# Patient Record
Sex: MALE
[De-identification: names, ages, dates, MRNs, and addresses within clinical notes are randomized per-mention and may not be internally consistent; named-entity substitution may affect disease eponyms.]

---

## 2024-07-22 PROBLEM — Z00.00 ENCOUNTER FOR PREVENTIVE HEALTH EXAMINATION: Status: ACTIVE | Noted: 2024-07-22

## 2024-07-25 ENCOUNTER — APPOINTMENT (OUTPATIENT)
Dept: ORTHOPEDIC SURGERY | Facility: CLINIC | Age: 54
End: 2024-07-25
Payer: OTHER MISCELLANEOUS

## 2024-07-25 VITALS — HEIGHT: 67 IN | BODY MASS INDEX: 29.82 KG/M2 | WEIGHT: 190 LBS

## 2024-07-25 DIAGNOSIS — M95.8 OTHER SPECIFIED ACQUIRED DEFORMITIES OF MUSCULOSKELETAL SYSTEM: ICD-10-CM

## 2024-07-25 PROCEDURE — 99204 OFFICE O/P NEW MOD 45 MIN: CPT

## 2024-07-25 PROCEDURE — 73564 X-RAY EXAM KNEE 4 OR MORE: CPT | Mod: 50

## 2024-07-25 RX ORDER — MELOXICAM 15 MG/1
15 TABLET ORAL DAILY
Qty: 30 | Refills: 1 | Status: ACTIVE | COMMUNITY
Start: 2024-07-25 | End: 1900-01-01

## 2024-07-26 NOTE — PHYSICAL EXAM
[de-identified] : Left knee full ROM medial joint tenderness some minimal patellar tenderness no lateral joint line tenderness.  Negative Cherri and Lachman  Quads 4+/5  [de-identified] : 4 views of both knees show left knee with minimal narrowing KL 2 no obvious fx subluxation

## 2024-07-26 NOTE — HISTORY OF PRESENT ILLNESS
[de-identified] : AMIRAH VIEIRA is a 54 year  old  M patient that presents today with left knee pain. pt states in 2021 he fell and broke his left patella that was treated followed by about a year of physical therapy. 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 Pain is in medial joint.

## 2024-07-26 NOTE — DISCUSSION/SUMMARY
[de-identified] : Dakota is an active  with a work related knee injury previously treated now with recurrent medial knee pain with work and no recent or recurrent injury.  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 X-rays show only KL 2 OA change .  We have discussed arthroscopic evaluation and possible Agili C osteochondral plug as a good surgical option however he would like to try further non operative treatment first.  PLan:  I believe he would benefit significantly from platelet rich plasma intra-articular injection in the office along with further Physical therapy.  we will attempt to get approval from  for the PRP injection as we start the Physical Therapy next week. Inject once approved as PRP has been effective in giving significant relief to chondral injury and arthritis in multiple prospective randomized controlled trials and confirmed in over 40 systematic reviews.  Review of MRI x-rays and discussion of treatment options with patient and his wife along with dictation took 45 minutes.

## 2024-07-26 NOTE — DISCUSSION/SUMMARY
[de-identified] : Dakota is an active  with a work related knee injury previously treated now with recurrent medial knee pain with work and no recent or recurrent injury.  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 X-rays show only KL 2 OA change .  We have discussed arthroscopic evaluation and possible Agili C osteochondral plug as a good surgical option however he would like to try further non operative treatment first.  PLan:  I believe he would benefit significantly from platelet rich plasma intra-articular injection in the office along with further Physical therapy.  we will attempt to get approval from  for the PRP injection as we start the Physical Therapy next week. Inject once approved as PRP has been effective in giving significant relief to chondral injury and arthritis in multiple prospective randomized controlled trials and confirmed in over 40 systematic reviews.  Review of MRI x-rays and discussion of treatment options with patient and his wife along with dictation took 45 minutes.

## 2024-07-26 NOTE — PHYSICAL EXAM
[de-identified] : Left knee full ROM medial joint tenderness some minimal patellar tenderness no lateral joint line tenderness.  Negative Cherri and Lachman  Quads 4+/5  [de-identified] : 4 views of both knees show left knee with minimal narrowing KL 2 no obvious fx subluxation

## 2024-07-26 NOTE — HISTORY OF PRESENT ILLNESS
[de-identified] : AMIRAH VIEIRA is a 54 year  old  M patient that presents today with left knee pain. pt states in 2021 he fell and broke his left patella that was treated followed by about a year of physical therapy. 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 Pain is in medial joint.

## 2024-08-09 ENCOUNTER — NON-APPOINTMENT (OUTPATIENT)
Age: 54
End: 2024-08-09

## 2024-09-11 ENCOUNTER — APPOINTMENT (OUTPATIENT)
Dept: ORTHOPEDIC SURGERY | Facility: CLINIC | Age: 54
End: 2024-09-11

## 2024-10-30 ENCOUNTER — APPOINTMENT (OUTPATIENT)
Dept: ORTHOPEDIC SURGERY | Facility: CLINIC | Age: 54
End: 2024-10-30
Payer: OTHER MISCELLANEOUS

## 2024-10-30 PROCEDURE — 99213 OFFICE O/P EST LOW 20 MIN: CPT

## 2024-11-12 ENCOUNTER — APPOINTMENT (OUTPATIENT)
Dept: ORTHOPEDIC SURGERY | Facility: CLINIC | Age: 54
End: 2024-11-12
Payer: SELF-PAY

## 2024-11-12 PROCEDURE — 0232T NJX PLATELET PLASMA: CPT | Mod: Q0

## 2024-11-12 PROCEDURE — 005KIT: CUSTOM | Mod: 25

## 2025-01-02 ENCOUNTER — APPOINTMENT (OUTPATIENT)
Dept: ORTHOPEDIC SURGERY | Facility: CLINIC | Age: 55
End: 2025-01-02
Payer: OTHER MISCELLANEOUS

## 2025-01-02 DIAGNOSIS — M95.8 OTHER SPECIFIED ACQUIRED DEFORMITIES OF MUSCULOSKELETAL SYSTEM: ICD-10-CM

## 2025-01-02 PROCEDURE — 99213 OFFICE O/P EST LOW 20 MIN: CPT

## 2025-02-27 ENCOUNTER — APPOINTMENT (OUTPATIENT)
Dept: ORTHOPEDIC SURGERY | Facility: CLINIC | Age: 55
End: 2025-02-27